# Patient Record
Sex: MALE | Race: WHITE | NOT HISPANIC OR LATINO | URBAN - METROPOLITAN AREA
[De-identification: names, ages, dates, MRNs, and addresses within clinical notes are randomized per-mention and may not be internally consistent; named-entity substitution may affect disease eponyms.]

---

## 2019-06-19 ENCOUNTER — OFFICE VISIT (OUTPATIENT)
Dept: URBAN - METROPOLITAN AREA CLINIC 40 | Facility: CLINIC | Age: 69
End: 2019-06-19
Payer: MEDICARE

## 2019-06-19 DIAGNOSIS — H15.112 EPISCLERITIS PERIODICA FUGAX, LEFT EYE: Primary | ICD-10-CM

## 2019-06-19 PROCEDURE — 92004 COMPRE OPH EXAM NEW PT 1/>: CPT | Performed by: OPTOMETRIST

## 2019-06-19 PROCEDURE — 92014 COMPRE OPH EXAM EST PT 1/>: CPT | Performed by: OPTOMETRIST

## 2019-06-19 RX ORDER — PREDNISOLONE ACETATE 10 MG/ML
1 % SUSPENSION/ DROPS OPHTHALMIC
Qty: 5 | Refills: 0 | Status: INACTIVE
Start: 2019-06-19 | End: 2019-07-16

## 2019-06-19 ASSESSMENT — KERATOMETRY
OS: 42.38
OD: 42.88

## 2019-06-19 ASSESSMENT — INTRAOCULAR PRESSURE
OS: 14
OD: 14

## 2021-01-26 ENCOUNTER — OFFICE VISIT (OUTPATIENT)
Dept: URBAN - METROPOLITAN AREA CLINIC 16 | Facility: CLINIC | Age: 71
End: 2021-01-26
Payer: MEDICARE

## 2021-01-26 DIAGNOSIS — H43.313 VITREOUS MEMBRANES AND STRANDS, BILATERAL: ICD-10-CM

## 2021-01-26 DIAGNOSIS — Z96.1 PRESENCE OF PSEUDOPHAKIA: Primary | ICD-10-CM

## 2021-01-26 DIAGNOSIS — H52.223 REGULAR ASTIGMATISM, BILATERAL: ICD-10-CM

## 2021-01-26 PROCEDURE — 99213 OFFICE O/P EST LOW 20 MIN: CPT | Performed by: OPTOMETRIST

## 2021-01-26 ASSESSMENT — KERATOMETRY
OS: 42.25
OD: 42.88

## 2021-01-26 ASSESSMENT — INTRAOCULAR PRESSURE
OD: 15
OS: 15

## 2021-01-26 ASSESSMENT — VISUAL ACUITY
OS: 20/20
OD: 20/20

## 2021-01-26 NOTE — IMPRESSION/PLAN
Impression: Vitreous membranes and strands, bilateral: H43.313. Bilateral. Plan: Vitreous floaters accounts for symptoms. Discussed signs/symptoms of retinal detachment, RTC immediately if symptoms worsen/occur; otherwise, monitor yearly.

## 2021-01-26 NOTE — IMPRESSION/PLAN
Impression: Regular astigmatism, bilateral: H52.223. Bilateral. Plan: Refractive error accounts for symptoms. Release SRX. All ocular structures appear healthy today, OU. RTC 1 year x CEE.

## 2021-01-26 NOTE — IMPRESSION/PLAN
Impression: Presence of pseudophakia: Z96.1. Bilateral. Plan: Condition is stable, no further treatment at this time. Monitor.

## 2023-12-01 ENCOUNTER — OFFICE VISIT (OUTPATIENT)
Dept: URBAN - METROPOLITAN AREA CLINIC 16 | Facility: CLINIC | Age: 73
End: 2023-12-01
Payer: MEDICARE

## 2023-12-01 DIAGNOSIS — H43.813 VITREOUS DEGENERATION, BILATERAL: ICD-10-CM

## 2023-12-01 DIAGNOSIS — H26.493 OTHER SECONDARY CATARACT, BILATERAL: Primary | ICD-10-CM

## 2023-12-01 PROCEDURE — 92014 COMPRE OPH EXAM EST PT 1/>: CPT | Performed by: OPTOMETRIST

## 2023-12-01 ASSESSMENT — INTRAOCULAR PRESSURE
OD: 15
OS: 15

## 2023-12-08 ENCOUNTER — OFFICE VISIT (OUTPATIENT)
Dept: URBAN - METROPOLITAN AREA CLINIC 16 | Facility: CLINIC | Age: 73
End: 2023-12-08
Payer: MEDICARE

## 2023-12-08 DIAGNOSIS — H26.492 OTHER SECONDARY CATARACT, LEFT EYE: Primary | ICD-10-CM

## 2023-12-08 PROCEDURE — 99204 OFFICE O/P NEW MOD 45 MIN: CPT | Performed by: OPHTHALMOLOGY

## 2023-12-08 ASSESSMENT — KERATOMETRY
OD: 42.63
OS: 42.00

## 2023-12-08 ASSESSMENT — VISUAL ACUITY
OD: 20/25
OS: 20/20

## 2023-12-08 ASSESSMENT — INTRAOCULAR PRESSURE
OS: 16
OD: 16

## 2024-01-08 ENCOUNTER — SURGERY (OUTPATIENT)
Dept: URBAN - METROPOLITAN AREA SURGERY 11 | Facility: SURGERY | Age: 74
End: 2024-01-08
Payer: MEDICARE

## 2024-01-08 PROCEDURE — 66821 AFTER CATARACT LASER SURGERY: CPT | Performed by: OPHTHALMOLOGY

## 2024-01-18 ENCOUNTER — POST-OPERATIVE VISIT (OUTPATIENT)
Dept: URBAN - METROPOLITAN AREA CLINIC 16 | Facility: CLINIC | Age: 74
End: 2024-01-18

## 2024-01-18 DIAGNOSIS — Z48.810 ENCOUNTER FOR SURGICAL AFTERCARE FOLLOWING SURGERY ON A SENSE ORGAN: Primary | ICD-10-CM

## 2024-01-18 PROCEDURE — 99024 POSTOP FOLLOW-UP VISIT: CPT | Performed by: OPTOMETRIST

## 2024-01-18 ASSESSMENT — INTRAOCULAR PRESSURE
OD: 15
OS: 16